# Patient Record
Sex: MALE | Race: WHITE | NOT HISPANIC OR LATINO | URBAN - METROPOLITAN AREA
[De-identification: names, ages, dates, MRNs, and addresses within clinical notes are randomized per-mention and may not be internally consistent; named-entity substitution may affect disease eponyms.]

---

## 2024-01-25 ENCOUNTER — OFFICE VISIT (OUTPATIENT)
Dept: PRIMARY CARE | Facility: CLINIC | Age: 24
End: 2024-01-25
Payer: COMMERCIAL

## 2024-01-25 VITALS
TEMPERATURE: 97.3 F | HEIGHT: 70 IN | RESPIRATION RATE: 18 BRPM | WEIGHT: 237 LBS | HEART RATE: 83 BPM | DIASTOLIC BLOOD PRESSURE: 84 MMHG | BODY MASS INDEX: 33.93 KG/M2 | OXYGEN SATURATION: 98 % | SYSTOLIC BLOOD PRESSURE: 132 MMHG

## 2024-01-25 DIAGNOSIS — R03.0 ELEVATED BLOOD PRESSURE READING: ICD-10-CM

## 2024-01-25 DIAGNOSIS — R45.89 DEPRESSED MOOD: ICD-10-CM

## 2024-01-25 DIAGNOSIS — Z00.00 HEALTH CARE MAINTENANCE: Primary | ICD-10-CM

## 2024-01-25 PROCEDURE — 99385 PREV VISIT NEW AGE 18-39: CPT | Performed by: FAMILY MEDICINE

## 2024-01-25 PROCEDURE — 90471 IMMUNIZATION ADMIN: CPT | Performed by: FAMILY MEDICINE

## 2024-01-25 PROCEDURE — 90715 TDAP VACCINE 7 YRS/> IM: CPT | Performed by: FAMILY MEDICINE

## 2024-01-25 PROCEDURE — 1036F TOBACCO NON-USER: CPT | Performed by: FAMILY MEDICINE

## 2024-01-25 ASSESSMENT — PATIENT HEALTH QUESTIONNAIRE - PHQ9
2. FEELING DOWN, DEPRESSED OR HOPELESS: SEVERAL DAYS
SUM OF ALL RESPONSES TO PHQ9 QUESTIONS 1 AND 2: 2
10. IF YOU CHECKED OFF ANY PROBLEMS, HOW DIFFICULT HAVE THESE PROBLEMS MADE IT FOR YOU TO DO YOUR WORK, TAKE CARE OF THINGS AT HOME, OR GET ALONG WITH OTHER PEOPLE: SOMEWHAT DIFFICULT
SUM OF ALL RESPONSES TO PHQ9 QUESTIONS 1 AND 2: 1
1. LITTLE INTEREST OR PLEASURE IN DOING THINGS: SEVERAL DAYS
10. IF YOU CHECKED OFF ANY PROBLEMS, HOW DIFFICULT HAVE THESE PROBLEMS MADE IT FOR YOU TO DO YOUR WORK, TAKE CARE OF THINGS AT HOME, OR GET ALONG WITH OTHER PEOPLE: SOMEWHAT DIFFICULT
2. FEELING DOWN, DEPRESSED OR HOPELESS: SEVERAL DAYS
1. LITTLE INTEREST OR PLEASURE IN DOING THINGS: NOT AT ALL

## 2024-01-25 ASSESSMENT — ENCOUNTER SYMPTOMS
DEPRESSION: 0
SHORTNESS OF BREATH: 0
HEADACHES: 0
OCCASIONAL FEELINGS OF UNSTEADINESS: 0
LOSS OF SENSATION IN FEET: 0

## 2024-01-25 NOTE — PROGRESS NOTES
"Subjective     Suhail Gillespie is a 23 y.o. male who presents for Annual Exam.    HPI     Pt is here today for annual well exam.  He is new to practice.  He reports recent depressed mood.  He sees a therapist. Patient denies any suicidal ideation or plan.   He is considering antidepressant medication.      Review of Systems   Respiratory:  Negative for shortness of breath.    Cardiovascular:  Negative for chest pain.   Neurological:  Negative for headaches.       Objective     Vitals:    01/25/24 1518   BP: 132/84   BP Location: Left arm   Patient Position: Sitting   Pulse: 83   Resp: 18   Temp: 36.3 °C (97.3 °F)   TempSrc: Temporal   SpO2: 98%   Weight: 108 kg (237 lb)   Height: 1.778 m (5' 10\")        No current outpatient medications     History reviewed. No pertinent surgical history.     Social History     Tobacco Use    Smoking status: Never    Smokeless tobacco: Never   Vaping Use    Vaping Use: Every day   Substance Use Topics    Alcohol use: Not Currently     Comment: 1-2 drinks a month    Drug use: Never        Family History   Problem Relation Name Age of Onset    Depression Mother      Hypertension Father          Immunization History   Administered Date(s) Administered    Flu vaccine (IIV4), preservative free *Check age/dose* 10/01/2013, 11/07/2018, 11/04/2021    Flu vaccine, quadrivalent, no egg protein, age 6 month or greater (FLUCELVAX) 11/20/2023    Flu vaccine, quadrivalent, recombinant, preservative free, adult (FLUBLOK) 10/24/2020, 01/05/2023    HPV, Quadrivalent 10/01/2013, 12/18/2013, 05/13/2014    Hepatitis A vaccine, pediatric/adolescent (HAVRIX, VAQTA) 03/29/2011    Influenza, seasonal, injectable 10/22/2015    Meningococcal B vaccine (BEXSERO) 06/11/2019    Meningococcal MCV4P 08/20/2013    Moderna COVID-19 vaccine, Fall 2023, 12 yeasrs and older (50mcg/0.5mL) 11/20/2023    Moderna COVID-19 vaccine, bivalent, blue cap/gray label *Check age/dose* 01/05/2023    Moderna SARS-CoV-2 " Vaccination 03/30/2021, 04/27/2021, 12/07/2021    Tdap vaccine, age 7 year and older (BOOSTRIX, ADACEL) 08/20/2013, 01/25/2024        Physical Exam  Vitals reviewed.   Constitutional:       General: He is not in acute distress.     Appearance: Normal appearance. He is obese.   HENT:      Head: Normocephalic and atraumatic.      Right Ear: Tympanic membrane, ear canal and external ear normal.      Left Ear: Tympanic membrane, ear canal and external ear normal.      Nose: Nose normal.      Mouth/Throat:      Mouth: Mucous membranes are moist.      Pharynx: Oropharynx is clear. No oropharyngeal exudate or posterior oropharyngeal erythema.   Eyes:      Extraocular Movements: Extraocular movements intact.      Pupils: Pupils are equal, round, and reactive to light.   Neck:      Thyroid: No thyroid mass or thyromegaly.   Cardiovascular:      Rate and Rhythm: Normal rate and regular rhythm.      Heart sounds: No murmur heard.  Pulmonary:      Effort: Pulmonary effort is normal. No respiratory distress.      Breath sounds: Normal breath sounds. No wheezing, rhonchi or rales.   Abdominal:      General: Abdomen is flat. There is no distension.      Palpations: Abdomen is soft.      Tenderness: There is no abdominal tenderness.   Genitourinary:     Testes: Normal.   Musculoskeletal:         General: Normal range of motion.   Lymphadenopathy:      Cervical: No cervical adenopathy.   Skin:     General: Skin is warm and dry.      Findings: No rash.   Neurological:      General: No focal deficit present.      Mental Status: He is alert and oriented to person, place, and time. Mental status is at baseline.   Psychiatric:         Mood and Affect: Mood normal.         Behavior: Behavior normal.         Problem List Items Addressed This Visit       Elevated blood pressure reading     Other Visit Diagnoses       Health care maintenance    -  Primary    Relevant Orders    POCT UA (nonautomated) manually resulted    Comprehensive  Metabolic Panel    Lipid Panel    CBC and Auto Differential    Hemoglobin A1C    Depressed mood        Relevant Orders    TSH with reflex to Free T4 if abnormal            Assessment/Plan     Well Exam - new patient    Vaccines - tdap given today    Flu vaccine utd     Complete labs    Healthy diet, routine exercise was discussed with patient      Elevated blood pressure - Patient was instructed to record blood pressures (using an arm BP monitor) at home 1-2 times per day (per AHA guidelines) and to follow up in office for a blood pressure recheck in 4-6 weeks.  I also encouraged low-sodium diet and regular exercise.  I also discussed with patient the importance of good blood pressure control to avoid long-term complications such as heart attack and stroke.     Depressed mood - pt goes to therapy , once every three weeks which helps.  He is interested in considering an antidepressant.  He will follow up in 2 weeks for depression follow up.      Follow up in 1-2 weeks for depression evaluation

## 2024-07-29 ENCOUNTER — LAB (OUTPATIENT)
Dept: LAB | Facility: LAB | Age: 24
End: 2024-07-29
Payer: COMMERCIAL

## 2024-07-29 DIAGNOSIS — R45.89 DEPRESSED MOOD: ICD-10-CM

## 2024-07-29 DIAGNOSIS — Z00.00 HEALTH CARE MAINTENANCE: ICD-10-CM

## 2024-07-29 DIAGNOSIS — R74.8 ALKALINE PHOSPHATASE ELEVATION: ICD-10-CM

## 2024-07-29 LAB
ALBUMIN SERPL BCP-MCNC: 5.1 G/DL (ref 3.4–5)
ALP SERPL-CCNC: 167 U/L (ref 33–120)
ALT SERPL W P-5'-P-CCNC: 41 U/L (ref 10–52)
ANION GAP SERPL CALC-SCNC: 11 MMOL/L (ref 10–20)
AST SERPL W P-5'-P-CCNC: 20 U/L (ref 9–39)
BASOPHILS # BLD AUTO: 0.05 X10*3/UL (ref 0–0.1)
BASOPHILS NFR BLD AUTO: 0.9 %
BILIRUB SERPL-MCNC: 1 MG/DL (ref 0–1.2)
BUN SERPL-MCNC: 11 MG/DL (ref 6–23)
CALCIUM SERPL-MCNC: 9.9 MG/DL (ref 8.6–10.3)
CHLORIDE SERPL-SCNC: 107 MMOL/L (ref 98–107)
CHOLEST SERPL-MCNC: 149 MG/DL (ref 0–199)
CHOLESTEROL/HDL RATIO: 3.6
CO2 SERPL-SCNC: 26 MMOL/L (ref 21–32)
CREAT SERPL-MCNC: 1.05 MG/DL (ref 0.5–1.3)
EGFRCR SERPLBLD CKD-EPI 2021: >90 ML/MIN/1.73M*2
EOSINOPHIL # BLD AUTO: 0.4 X10*3/UL (ref 0–0.7)
EOSINOPHIL NFR BLD AUTO: 7.3 %
ERYTHROCYTE [DISTWIDTH] IN BLOOD BY AUTOMATED COUNT: 12 % (ref 11.5–14.5)
GLUCOSE SERPL-MCNC: 78 MG/DL (ref 74–99)
HCT VFR BLD AUTO: 48 % (ref 41–52)
HDLC SERPL-MCNC: 41 MG/DL
HGB BLD-MCNC: 16.6 G/DL (ref 13.5–17.5)
IMM GRANULOCYTES # BLD AUTO: 0.02 X10*3/UL (ref 0–0.7)
IMM GRANULOCYTES NFR BLD AUTO: 0.4 % (ref 0–0.9)
LDLC SERPL CALC-MCNC: 85 MG/DL
LYMPHOCYTES # BLD AUTO: 2.09 X10*3/UL (ref 1.2–4.8)
LYMPHOCYTES NFR BLD AUTO: 38.1 %
MCH RBC QN AUTO: 30.3 PG (ref 26–34)
MCHC RBC AUTO-ENTMCNC: 34.6 G/DL (ref 32–36)
MCV RBC AUTO: 88 FL (ref 80–100)
MONOCYTES # BLD AUTO: 0.47 X10*3/UL (ref 0.1–1)
MONOCYTES NFR BLD AUTO: 8.6 %
NEUTROPHILS # BLD AUTO: 2.46 X10*3/UL (ref 1.2–7.7)
NEUTROPHILS NFR BLD AUTO: 44.7 %
NON HDL CHOLESTEROL: 108 MG/DL (ref 0–149)
NRBC BLD-RTO: 0 /100 WBCS (ref 0–0)
PLATELET # BLD AUTO: 265 X10*3/UL (ref 150–450)
POTASSIUM SERPL-SCNC: 4.1 MMOL/L (ref 3.5–5.3)
PROT SERPL-MCNC: 7.4 G/DL (ref 6.4–8.2)
RBC # BLD AUTO: 5.47 X10*6/UL (ref 4.5–5.9)
SODIUM SERPL-SCNC: 140 MMOL/L (ref 136–145)
TRIGL SERPL-MCNC: 113 MG/DL (ref 0–149)
TSH SERPL-ACNC: 1.34 MIU/L (ref 0.44–3.98)
VLDL: 23 MG/DL (ref 0–40)
WBC # BLD AUTO: 5.5 X10*3/UL (ref 4.4–11.3)

## 2024-07-29 PROCEDURE — 83036 HEMOGLOBIN GLYCOSYLATED A1C: CPT

## 2024-07-29 PROCEDURE — 82977 ASSAY OF GGT: CPT

## 2024-07-29 PROCEDURE — 84443 ASSAY THYROID STIM HORMONE: CPT

## 2024-07-29 PROCEDURE — 80061 LIPID PANEL: CPT

## 2024-07-29 PROCEDURE — 80053 COMPREHEN METABOLIC PANEL: CPT

## 2024-07-29 PROCEDURE — 36415 COLL VENOUS BLD VENIPUNCTURE: CPT

## 2024-07-29 PROCEDURE — 85025 COMPLETE CBC W/AUTO DIFF WBC: CPT

## 2024-07-30 LAB
EST. AVERAGE GLUCOSE BLD GHB EST-MCNC: 80 MG/DL
HBA1C MFR BLD: 4.4 %

## 2024-08-01 ENCOUNTER — OFFICE VISIT (OUTPATIENT)
Dept: PRIMARY CARE | Facility: CLINIC | Age: 24
End: 2024-08-01
Payer: COMMERCIAL

## 2024-08-01 VITALS
DIASTOLIC BLOOD PRESSURE: 88 MMHG | SYSTOLIC BLOOD PRESSURE: 128 MMHG | WEIGHT: 222 LBS | RESPIRATION RATE: 18 BRPM | HEART RATE: 90 BPM | OXYGEN SATURATION: 96 % | TEMPERATURE: 97.4 F | BODY MASS INDEX: 31.85 KG/M2

## 2024-08-01 DIAGNOSIS — R74.8 ALKALINE PHOSPHATASE ELEVATION: ICD-10-CM

## 2024-08-01 DIAGNOSIS — E66.09 CLASS 1 OBESITY DUE TO EXCESS CALORIES WITHOUT SERIOUS COMORBIDITY WITH BODY MASS INDEX (BMI) OF 31.0 TO 31.9 IN ADULT: Primary | ICD-10-CM

## 2024-08-01 LAB — GGT SERPL-CCNC: 16 U/L (ref 5–64)

## 2024-08-01 PROCEDURE — 99213 OFFICE O/P EST LOW 20 MIN: CPT | Performed by: FAMILY MEDICINE

## 2024-08-01 PROCEDURE — 1036F TOBACCO NON-USER: CPT | Performed by: FAMILY MEDICINE

## 2024-08-01 ASSESSMENT — ENCOUNTER SYMPTOMS
SHORTNESS OF BREATH: 0
HEADACHES: 0

## 2024-08-01 NOTE — PROGRESS NOTES
"Mónica Gillespie \"Juvencio\" is a 23 y.o. male who presents for Weight Loss.    HPI     Pt started semaglutide in late May 2024 through an online weight loss clinic.  He lost approximately 25 lbs since starting the semaglutide.  He has occasional upset stomach and constipation but no nausea, vomiting, diarrhea.  He reports that overall he is tolerating the compounded semaglutide well.  He pays about 450$ per month for the semaglutide and is trying to get a cheaper option.      Goal weight is < 200 lbs.      Patient has not been exercising.      Review of Systems   Respiratory:  Negative for shortness of breath.    Cardiovascular:  Negative for chest pain.   Neurological:  Negative for headaches.       Objective     Vitals:    08/01/24 1256 08/01/24 1326   BP: 133/85 128/88   BP Location: Left arm    Patient Position: Sitting    Pulse: 90    Resp: 18    Temp: 36.3 °C (97.4 °F)    TempSrc: Temporal    SpO2: 96%    Weight: 101 kg (222 lb)         Current Outpatient Medications   Medication Instructions    semaglutide (OZEMPIC SUBQ) subcutaneous        No Known Allergies     History reviewed. No pertinent surgical history.     Social History     Tobacco Use    Smoking status: Never    Smokeless tobacco: Never   Vaping Use    Vaping status: Every Day   Substance Use Topics    Alcohol use: Not Currently     Comment: 1-2 drinks a month    Drug use: Never        Social History     Substance and Sexual Activity   Alcohol Use Not Currently    Comment: 1-2 drinks a month       Family History   Problem Relation Name Age of Onset    Depression Mother      Hypertension Father          Immunization History   Administered Date(s) Administered    Flu vaccine (IIV4), preservative free *Check age/dose* 10/01/2013, 11/07/2018, 11/04/2021    Flu vaccine, quadrivalent, no egg protein, age 6 month or greater (FLUCELVAX) 11/20/2023    Flu vaccine, quadrivalent, recombinant, preservative free, adult (FLUBLOK) 10/24/2020, " 01/05/2023    HPV, Quadrivalent 10/01/2013, 12/18/2013, 05/13/2014    Hepatitis A vaccine, pediatric/adolescent (HAVRIX, VAQTA) 03/29/2011    Influenza, seasonal, injectable 10/22/2015    Meningococcal ACWY-D (Menactra) 4-valent conjugate vaccine 08/20/2013    Meningococcal B vaccine (BEXSERO) 06/11/2019    Moderna COVID-19 vaccine, Fall 2023, 12 yeasrs and older (50mcg/0.5mL) 11/20/2023    Moderna SARS-CoV-2 Vaccination 03/30/2021, 04/27/2021, 12/07/2021    Tdap vaccine, age 7 year and older (BOOSTRIX, ADACEL) 08/20/2013, 01/25/2024        Physical Exam  Vitals reviewed.   Constitutional:       General: He is not in acute distress.     Appearance: Normal appearance. He is well-developed. He is obese.   HENT:      Head: Normocephalic and atraumatic.   Eyes:      General: Lids are normal.      Conjunctiva/sclera:      Right eye: Right conjunctiva is not injected.      Left eye: Left conjunctiva is not injected.   Cardiovascular:      Rate and Rhythm: Normal rate and regular rhythm.      Heart sounds: No murmur heard.  Pulmonary:      Effort: Pulmonary effort is normal. No respiratory distress.      Breath sounds: Normal breath sounds. No wheezing, rhonchi or rales.   Skin:     General: Skin is warm and dry.      Findings: No rash.   Neurological:      Mental Status: He is alert and oriented to person, place, and time. Mental status is at baseline.   Psychiatric:         Mood and Affect: Mood normal.         Behavior: Behavior normal.         Problem List Items Addressed This Visit    None  Visit Diagnoses       Class 1 obesity due to excess calories without serious comorbidity with body mass index (BMI) of 31.0 to 31.9 in adult    -  Primary    Relevant Orders    Follow Up In [facility] - Pharmacy    Referral to Nutrition Services    Alkaline phosphatase elevation        Relevant Orders    Gamma-Glutamyl Transferase    Alkaline Phosphatase            Assessment/Plan     Obesity - on semaglutide through  online pharmacy however he is concerned about the cost of the semaglutide.  I recommend pt see clinical pharmacy to discuss weight loss mediation options and pricing , pt agreeable.   Nutrition referral also placed.  Healthy diet, routine exercise was discussed with patient      Alk phos elevation - mild - recheck with [facility].      Follow up 3 months or sooner if needed

## 2024-08-28 ENCOUNTER — APPOINTMENT (OUTPATIENT)
Dept: PHARMACY | Facility: HOSPITAL | Age: 24
End: 2024-08-28
Payer: COMMERCIAL

## 2024-08-28 DIAGNOSIS — E66.09 CLASS 1 OBESITY DUE TO EXCESS CALORIES WITHOUT SERIOUS COMORBIDITY WITH BODY MASS INDEX (BMI) OF 31.0 TO 31.9 IN ADULT: ICD-10-CM

## 2024-08-28 NOTE — PROGRESS NOTES
"[facility] Pharmacist Visit - Weight Management  Suhail Gillespie \"Juvencio\" is a 23 y.o. male was referred to Clinical Pharmacy Team for Obesity.    Referring Provider: Roger Perez DO  PCP: Roger Perez DO - last visit: 8/1/24, next visit: -    Subjective   HPI  PMH significant for none.  Special needs/barriers to therapy: none    WEIGHT MANAGEMENT  BMI Readings from Last 1 Encounters:   08/01/24 31.85 kg/m²   Starting weight: 222 lbs (101 kg)  Current weight: -    Lab Results   Component Value Date    HGBA1C 4.4 07/29/2024    GLUCOSE 78 07/29/2024   Hyperglycemia: Denies signs and symptoms    Lifestyle  Diet: 2 meals/day.   BK: skip often  LN: light meal  DN: protein and vegetables  Snacks: rarely  Drinks: soda occasionally, water and tea  Has worked with nutritionist/dietician? No    Physical Activity: tennis and work out a couple of times a week    Other Potential Contributing Factors  Alcohol use: Average 1-2 drinks/week  Medications that may cause weight gain: none  Mental health: No current concerns  Comorbidities:  none    Non-pharmacological therapy  Weight loss techniques attempted:  Self-directed dieting: self  Commercial weight loss program: [facility], currently still on, lost about 20 lbs, was 247lb prior to program      Pharmacological therapy  Current Medications: compounded GLP-1 right now, through [facility]  Previous Medications: none     Adherence: Takes medication as directed and reports no missed doses  Adverse Effects: none    Insurance coverage of weight-loss medications? No,    Eligible for copay cards/programs? Yes, commercial    Medication Patient Risks/Cautions Notes   Wegovy (semaglutide) None    Zepbound (tirzepatide) None    Saxenda (liraglutide) None Current backorder of starting doses   Qsymia (phentermine-topiramate) None Controlled substance   Contrave (bupropion-naltrexone) None    Adipex (phentermine) None Controlled substance,  Short-term use only   Franc/Xenical (orlistat)  " Adverse effects likely outweigh benefit.     Weight goal: 190-200 lbs      Secondary Prevention  ASCVD Risk:   The ASCVD Risk score (Jonnie KEATING, et al., 2019) failed to calculate for the following reasons:    The 2019 ASCVD risk score is only valid for ages 40 to 79  On Statin?: No,      Immunizations Needed: All up-to-date and documented  Tobacco Use:  Vapes    Objective   Vitals  BP Readings from Last 2 Encounters:   08/01/24 128/88   01/25/24 132/84     BMI Readings from Last 1 Encounters:   08/01/24 31.85 kg/m²      Labs  A1C  Lab Results   Component Value Date    HGBA1C 4.4 07/29/2024     BMP  Lab Results   Component Value Date    CALCIUM 9.9 07/29/2024     07/29/2024    K 4.1 07/29/2024    CO2 26 07/29/2024     07/29/2024    BUN 11 07/29/2024    CREATININE 1.05 07/29/2024    EGFR >90 07/29/2024     LFTs  Lab Results   Component Value Date    ALT 41 07/29/2024    AST 20 07/29/2024    ALKPHOS 167 (H) 07/29/2024    BILITOT 1.0 07/29/2024     FLP  Lab Results   Component Value Date    TRIG 113 07/29/2024    CHOL 149 07/29/2024    LDLCALC 85 07/29/2024    HDL 41.0 07/29/2024       Assessment/Plan   Problem List Items Addressed This Visit    None      Weight Management: Current regimen includes compounded GLP-1. Patient's current weight reported as 222 lbs.  Discussed in depth the risk and benefits of each medication for weight loss:  GLP-1: not covered by her insurance. Provides the most weight loss and the best result  Mounjaro $1200 out of pocket  Wegovy $1500 out of pocket  Qsymia (Phentermine/topiramate): not as effective as Glp1 but has shown benefits for weight loss. $800 out of pocket  Contrave (bupropion/naltrexone): has shown benefits for weight loss and benefits in pt who has hx of depression/anxiety. Out of pocket roughly $99  Metformin: minor data for weight loss benefits but has shown benefits in some patients. $5  Topiramate: alone not as effective for weight loss. $5  Pt wants to hold off  on starting anything new at this time  Pt educated to combine medication with proper diet and exercise modifications: DASH diet, more vegetables, less eating out, more home cook meals, and at least 150 mins of exercise per week.  Pt aware of risk vs benefits. FUV in 4 week to discuss benefits, tolerance and titrations.  Continue to focus on lifestyle modifications as discussed.  Discussed in depths risk and benefits of compounded pharmacy and non-FDA approved compounded GLP-1. Do not recommend at this time    Patient Education:  Counseled patient on relevant MOA, expectations, side effects, duration of therapy, contraindications, administration, and monitoring parameters.  All questions and concerns addressed. Contact pharmacist with any further questions or concerns prior to next appointment.  Reviewed dietary recommendations: Healthy Plate method    Clinical Pharmacist follow-up: prn, Telehealth visit    Jaret HemphillD  Clinical Pharmacist  08/28/24    Continue all meds under the continuation of care with the referring provider and clinical pharmacy team.  Verbal consent to manage patient's drug therapy was obtained from the patient. They were informed they may decline to participate or withdraw from participation in pharmacy services at any time.

## 2024-09-10 ENCOUNTER — APPOINTMENT (OUTPATIENT)
Dept: PRIMARY CARE | Facility: CLINIC | Age: 24
End: 2024-09-10
Payer: COMMERCIAL

## 2024-10-21 ENCOUNTER — APPOINTMENT (OUTPATIENT)
Dept: PRIMARY CARE | Facility: CLINIC | Age: 24
End: 2024-10-21
Payer: COMMERCIAL

## 2025-01-14 ENCOUNTER — PATIENT OUTREACH (OUTPATIENT)
Dept: CARE COORDINATION | Facility: CLINIC | Age: 25
End: 2025-01-14
Payer: COMMERCIAL

## 2025-01-14 NOTE — PROGRESS NOTES
Nutrition referral received; unable to reach pt- left voice message and will send a message in [employer] before closing referral.